# Patient Record
Sex: FEMALE | Race: WHITE | Employment: FULL TIME | URBAN - METROPOLITAN AREA
[De-identification: names, ages, dates, MRNs, and addresses within clinical notes are randomized per-mention and may not be internally consistent; named-entity substitution may affect disease eponyms.]

---

## 2018-10-31 ENCOUNTER — VIRTUAL VISIT (OUTPATIENT)
Dept: FAMILY MEDICINE | Facility: OTHER | Age: 48
End: 2018-10-31

## 2018-11-01 ENCOUNTER — OFFICE VISIT - HEALTHEAST (OUTPATIENT)
Dept: FAMILY MEDICINE | Facility: CLINIC | Age: 48
End: 2018-11-01

## 2018-11-01 ENCOUNTER — COMMUNICATION - HEALTHEAST (OUTPATIENT)
Dept: TELEHEALTH | Facility: CLINIC | Age: 48
End: 2018-11-01

## 2018-11-01 DIAGNOSIS — R07.0 THROAT PAIN: ICD-10-CM

## 2018-11-01 LAB — DEPRECATED S PYO AG THROAT QL EIA: NORMAL

## 2018-11-01 NOTE — PROGRESS NOTES
"Date:   Clinician: Vida Alberts  Clinician NPI: [id number]  Patient: Brianne Reddy  Patient : 1970  Patient Address: [facility] [address], [place] [address]  Patient Phone: [phone]  Visit Protocol: URI  Patient Summary:  Brianne is a 48 year old ( : 1970 ) female who initiated a Visit for cold, sinus infection, or influenza. When asked the question \"Please sign me up to receive news, health information and promotions from [employer].\", Brianne responded \"No\".    Brianne states her symptoms started gradually 3-6 days ago.   Her symptoms consist of enlarged lymph nodes, a sore throat, a cough, chills, malaise, and myalgia. Brianne also feels feverish but was unable to measure her temperature.   Symptom details     Cough: Brianne coughs a few times an hour and her cough is not more bothersome at night. Phlegm does not come into her throat when she coughs.     Sore throat: Brianne reports having severe throat pain (7-9 on a 10 point pain scale), has exudate on her tonsils, and can swallow liquids. The lymph nodes in her neck are enlarged. A rash has not appeared on the skin since the sore throat started.      Brianne denies having headache, ear pain, dyspnea, facial pain or pressure, wheezing, teeth pain, rhinitis, and nasal congestion. She also denies double sickening (worsening symptoms after initial improvement), taking antibiotic medication for the symptoms, and having recent facial or sinus surgery in the past 60 days.   Within the past week, Brainne has been exposed to someone with strep throat. She has not recently been exposed to someone with influenza. Brianne has not been in close contact with any high risk individuals.   Weight: 185 lbs   Brianne does not smoke or use smokeless tobacco.   She denies pregnancy and denies breastfeeding. She has menstruated in the past month.   MEDICATIONS: No current medications, ALLERGIES: NKDA  Clinician Response:  Dear Brianne,  I am " sorry you are not feeling well. Your health is our priority. Based on the information provided, a throat swab is needed to determine whether or not you have strep throat. Please be seen in a clinic or urgent care in the next 1-2 days for a rapid strep test.  You will not be charged for this Visit. Thank you for trusting us with your care.   Diagnosis: Refer for additional evaluation - strep pharyngitis  Diagnosis ICD: R69  Diagnosis ICD: 462.0

## 2018-11-02 LAB — GROUP A STREP BY PCR: NORMAL

## 2020-02-03 ENCOUNTER — APPOINTMENT (OUTPATIENT)
Dept: MAMMOGRAPHY | Facility: CLINIC | Age: 50
End: 2020-02-03
Payer: COMMERCIAL

## 2020-02-03 ENCOUNTER — OFFICE VISIT (OUTPATIENT)
Dept: OBGYN | Facility: CLINIC | Age: 50
End: 2020-02-03
Payer: COMMERCIAL

## 2020-02-03 VITALS
DIASTOLIC BLOOD PRESSURE: 62 MMHG | SYSTOLIC BLOOD PRESSURE: 112 MMHG | WEIGHT: 207.5 LBS | HEIGHT: 65 IN | BODY MASS INDEX: 34.57 KG/M2

## 2020-02-03 DIAGNOSIS — Z12.4 SCREENING FOR CERVICAL CANCER: ICD-10-CM

## 2020-02-03 DIAGNOSIS — Z80.3 FAMILY HISTORY OF MALIGNANT NEOPLASM OF BREAST: ICD-10-CM

## 2020-02-03 DIAGNOSIS — N93.9 ABNORMAL UTERINE BLEEDING (AUB): ICD-10-CM

## 2020-02-03 DIAGNOSIS — Z12.31 ENCOUNTER FOR SCREENING MAMMOGRAM FOR BREAST CANCER: ICD-10-CM

## 2020-02-03 DIAGNOSIS — Z01.419 ENCOUNTER FOR GYNECOLOGICAL EXAMINATION WITHOUT ABNORMAL FINDING: Primary | ICD-10-CM

## 2020-02-03 PROCEDURE — 99203 OFFICE O/P NEW LOW 30 MIN: CPT | Mod: 25 | Performed by: OBSTETRICS & GYNECOLOGY

## 2020-02-03 PROCEDURE — 99386 PREV VISIT NEW AGE 40-64: CPT | Performed by: OBSTETRICS & GYNECOLOGY

## 2020-02-03 ASSESSMENT — MIFFLIN-ST. JEOR: SCORE: 1577.09

## 2020-02-03 NOTE — PROGRESS NOTES
SUBJECTIVE:                                                   Brianne Reddy is a 49 year old  female who presents to clinic today for preventive health exam.   Periods are irregular in the last 2 years, occurring every 2-3 months, lasting up to 2 weeks. No intermenstrual bleeding, spotting, or discharge. No dyspareunia.  no hx STIs.    Tried nuvaring previously to try to regulate cycles and experienced very heavy bleeding with her cycles.     Denies any hirsutism. Does have hx regular periods.     Current contraception: vasectomy    ROS:  Const: mild weight gain,  no fever, chills  Breast: no nipple discharge, lumps, skin changes  Endo: no heat or cold intolerance, fatigue    No results found for: PAP     Family history of breast CA: Yes (Please explain): mother, diagnosed in her 40s. Mother had negative BRCA testing.  Family history of uterine/ovarian CA: Yes (Please explain): mother and maternal grandmother  Family history of colon CA: No    There is no problem list on file for this patient.    Past Surgical History:   Procedure Laterality Date     GYN SURGERY      c-sections x2      Social History     Tobacco Use     Smoking status: Former Smoker     Smokeless tobacco: Never Used   Substance Use Topics     Alcohol use: Yes      Problem (# of Occurrences) Relation (Name,Age of Onset)    Breast Cancer (1) Mother    Cancer (1) Mother: ovarian or uterine, patient unsure    Ovarian Cancer (1) Maternal Grandmother    Prostate Cancer (1) Maternal Grandfather            No current outpatient medications on file prior to visit.  No current facility-administered medications on file prior to visit.     No Known Allergies    Problem list and histories reviewed and adjusted as indicated.     OBJECTIVE:   There were no vitals taken for this visit.    Gen: Healthy appearing female, no acute distress, comfortable  HENT: No scleral injection or icterus, neck supple  CV: Regular rate, well perfused  Resp: normal work of  breathing, no cough  Breast Exam: No visible masses or suspicious skin changes.  No discrete or dominant masses to palpation.  No axillary lymphadenopathy.  GI: Abdomen soft, non-tender. No masses, organomegaly  Skin: No suspicious lesions or rashes  Psychiatric: mentation appears normal and affect bright    : External genitalia normal well-estrogenized, healthy tissue.  No obvious excoriations, lesions, or rashes. Bartholins, urethra, skeins normal.  Normal pink vaginal mucosa.  SSE: Normal cervix, copious blood in the vault  Bimanual: deferred    ASSESSMENT/PLAN:                                                    Brianne Reddy is a 49 year old female  with satisfactory annual exam and initial evaluation of AUB. We discussed the potential etiologies of AUB including hormonal changes (PCOS/oligoovulation, perimenopause), structural abnormalities (polyps, fibroids), endometrial hyperplasia or carcinoma, adenomyosis. Patient counselled on evaluation and potential options for treatment including hormonal management, progestin IUD, surgical intervention. Plan the following work up:    PLAN:  1. Screening for cervical cancer  - will collect pap smear at follow up visit due to copious vaginal bleeding today    2. Encounter for screening mammogram for breast cancer  - MA Diagnostic Digital Bilateral; Future    3. Encounter for gynecological examination without abnormal finding  - Lipid panel reflex to direct LDL Fasting; Future. F/u with PCP if abnormal.  - **Glucose FUTURE anytime; Future    4. Abnormal uterine bleeding (AUB)  - TSH with free T4 reflex; Future  - Prolactin; Future  - US Pelvic Complete with Transvaginal; Future     4. Family history breast cancer  - mother negative for BRCA, grandmother not tested.   - offered consultation with genetic counseling for risk evaluation given strong family history of breast and ovarian cancer. She declines at this time.     Plan endometrial biopsy at follow up  visit, review results at that time.     PE:  Reviewed health maintenance including diet, regular exercise   and periodic exams.    Return to clinic in 2-4 weeks for further evaluation of AUB.    COUNSELING:   Reviewed preventive health counseling, as reflected in patient instructions   reports that she has quit smoking. She has never used smokeless tobacco.        Niki Power MD   Obstetrics and Gynecology

## 2020-02-03 NOTE — NURSING NOTE
"Chief Complaint   Patient presents with     Physical     due for pap, NP.      Orders     for mammogram       Initial /62 (BP Location: Right arm, Patient Position: Chair, Cuff Size: Adult Regular)   Ht 1.651 m (5' 5\")   Wt 94.1 kg (207 lb 8 oz)   LMP 2020 (Approximate)   BMI 34.53 kg/m   Estimated body mass index is 34.53 kg/m  as calculated from the following:    Height as of this encounter: 1.651 m (5' 5\").    Weight as of this encounter: 94.1 kg (207 lb 8 oz).  BP completed using cuff size: regular    Questioned patient about current smoking habits.  Pt. quit smoking some time ago.          The following HM Due: mammogram  pap smear      Billy Stephens CMA                "

## 2020-02-03 NOTE — PATIENT INSTRUCTIONS
Get pelvic US for evaluation of abnormal bleeding    Follow up with me for pap smear and endometrial biopsy following ultrasound    Go to lab for bleeding and routine health labs sometime prior to your visit. You will need to be fasting for 8 hours ahead of time.         Preventive Health Recommendations  Female Ages 40-64  Yearly exam:    See your health care provider every year in order to    Review health changes.      Discuss preventive care.       Review your medicines if you your doctor has prescribed any.    Pap Smears: You should have a Pap test every 3 years or have a Pap test with HPV screening every 5 years.    You do not need a Pap test if your uterus was removed (hysterectomy) and you have not had cancer.   Breast Cancer Screening: You should begin mammography for breast cancer screening by age 40 or 50 depending on your personal risks and your doctors recommendation. Screening should occur every year or every other year based on your discussion with your doctor.  Colorectal Cancer Screening: Starting at age 50 you need to undergo colonoscopy (every 5-10 years) or other colon cancer screening.    Health Maintenance:  - Your cholesterol should be checked every 5 years, more often if you have increased risk factors such as diabetes, high blood pressure, tobacco use, obesity, or a strong family history of cardiovascular disease before age 50 in men and 60 in women  - If you are at risk for diabetes due to being overweight or obese, having a strong family history, or having a history of gestational diabetes you should have a diabetes test (fasting glucose or Hemoglobin A1c level) every 3 years.  Shots: Get a flu shot each year. Get a tetanus shot every 10 years.    Nutrition:      Eat at least 5 servings of fruits and vegetables each day.    Eat whole-grain bread, whole-wheat pasta, faro, quinoa, barley and brown rice instead of white grains and rice.    Consume 1000mg of Calcium and 1000u of Vitamin D  daily. If these are not in your regular diet you should take a supplement.    To calculate the calcium in your food add a zero to the percent found on the packaging (ex: 30% = 300mg of calcium per serving)    Good sources of Calcium include:    Low-fat dairy products (200 to 300 milligrams per serving)      Dark green leafy vegetables     Canned salmon or sardines with bones     Soy products, such as tofu     Calcium-fortified cereals and orange juice    Osteopenia is low bone mass, your risk increases once you reach menopause and your calcium needs then increase to 1,200mg daily    The [place] [employer] recommends that total calcium intake, from supplements and diet combined, should be no more than 2,000 milligrams daily for people older than 50.    Lifestyle    Get in at least 150 minutes of physical activity a week (30 minutes a day, 5 days of the week), of which about half should be vigorous exercise (jogging, swimming, lifting weights).  This will help you control your weight and prevent disease. You must increase the intensity and duration of exercise in order to lose weight, if that is your goal. To keep your bones strong and prevent fractures, plan at least 90 min/week of high impact exercise.    High-impact exercise includes workouts like:  Brisk walking.  Climbing stairs.  Dancing.  Hiking.  Jogging.  Jumping rope.  Step aerobics.  Light weight lifting routines.  Renato Chi and yoga.  Tennis or other racquet sports.      Limit alcohol to one drink per day.    No smoking.      Wear sunscreen to prevent skin cancer.    See your dentist every six months for an exam and cleaning        Menopause and Perimenopause    Hot flashes, night sweats, mood changes, sleep disturbances, changes in the menstrual periods, decreased interest in sex, vaginal dryness or painful sex, and changes in the skin and hair are all common symptoms of perimenopause (the period of time, lasting several years, leading up to menopause).  Menopause is defined as 12 months without a menstrual period.     It might be helpful to make a list of your symptoms, and to rank them in order of how much they bother you, or which ones you would fix first if you could. This can help us decide what treatment options might be best for you. Treatment can include one or more of the following: lifestyle changes like diet and exercise, supplements, prescription medications for hormones, and other prescriptions that are nonhormonal. We will discuss these in more detail after your test results (if you are having lab work done) are available, when you come back. Some tests that might be done for women in perimenopause include blood work for hormone levels and to check the thyroid or ultrasound or biopsy of the lining of the uterus (if you are having abnormal bleeding).

## 2020-05-01 ENCOUNTER — ANCILLARY PROCEDURE (OUTPATIENT)
Dept: ULTRASOUND IMAGING | Facility: CLINIC | Age: 50
End: 2020-05-01
Attending: OBSTETRICS & GYNECOLOGY
Payer: COMMERCIAL

## 2020-05-01 DIAGNOSIS — N93.9 ABNORMAL UTERINE BLEEDING (AUB): ICD-10-CM

## 2020-05-01 PROCEDURE — 76830 TRANSVAGINAL US NON-OB: CPT | Performed by: OBSTETRICS & GYNECOLOGY

## 2020-05-01 PROCEDURE — 76856 US EXAM PELVIC COMPLETE: CPT | Performed by: OBSTETRICS & GYNECOLOGY

## 2020-05-02 NOTE — RESULT ENCOUNTER NOTE
Please contact pt and rev U/S results done for AUB showing a fibroid uterus.  Rec F/U with Dr Power for further Rx    Dwight Lehman M.D.

## 2020-05-06 NOTE — RESULT ENCOUNTER NOTE
Please call patient with fibroid uterus on US, otherwise relatively normal findings. She should follow up with me for a phone visit in the next 2-4w to discuss management.     Niki Power MD

## 2020-06-01 ENCOUNTER — VIRTUAL VISIT (OUTPATIENT)
Dept: OBGYN | Facility: CLINIC | Age: 50
End: 2020-06-01
Payer: COMMERCIAL

## 2020-06-01 DIAGNOSIS — Z12.31 ENCOUNTER FOR SCREENING MAMMOGRAM FOR BREAST CANCER: ICD-10-CM

## 2020-06-01 DIAGNOSIS — N93.9 ABNORMAL UTERINE BLEEDING (AUB): Primary | ICD-10-CM

## 2020-06-01 PROCEDURE — 99213 OFFICE O/P EST LOW 20 MIN: CPT | Mod: GT | Performed by: OBSTETRICS & GYNECOLOGY

## 2020-06-01 RX ORDER — LEVONORGESTREL / ETHINYL ESTRADIOL AND ETHINYL ESTRADIOL 150-30(84)
1 KIT ORAL DAILY
Qty: 90 TABLET | Refills: 3 | Status: SHIPPED | OUTPATIENT
Start: 2020-06-01

## 2020-06-01 NOTE — PATIENT INSTRUCTIONS
"You can have fewer periods every year by taking your birth control pills continuously. After taking your pills regularly for 3 weeks, just skip the placebo pills (they will be a different color in your pack) and start the next pack. You can continue doing this as many packs in a row as you would like, though you may develop spotting or breakthrough bleeding. Most women prefer to take a 5 day pill break every 3 packs or 3 months to have a withdrawal bleed and \"reset\" the lining of the uterus.       Schedule a video visit in 2 months to follow up on combined oral contraceptives start and menopause symptoms.         Understanding Menopause  Menopause marks the point where you ve gone 12 months in a row without a period. The average age for this is around 51, but it can happen at younger or older ages. During the months or years before menopause, your body goes through many changes. It may be helpful to understand these changes and what you can do about the symptoms that result.     Use a portable fan to help stay cool.    Symptoms  Perimenopause is sometimes called the menopause transition. It happens in the months or years before menopause. It may begin when you reach your mid-40s. During this time, your estrogen levels go up and down and then decrease. As a result, you may notice some of the following symptoms:    Menstrual periods that come more or less often than usual    Menstrual periods that are lighter or heavier than normal    Increased premenstrual syndrome (PMS) symptoms    Hot flashes    Night sweats    Mood swings    Vaginal dryness with possible painful sexual activity    Difficulty going to sleep or staying asleep    Decreased sexual drive and function    Urinating frequently  It is important to remember that you could become pregnant until 12 months have passed since your last menstrual period. Ask your healthcare provider about birth control choices.   Controlling symptoms  Your healthcare provider may " suggest pills or an intrauterine device (IUD) that contain the hormone progesterone. This can make your periods more regular and prevent excess bleeding. If you have symptoms due to lower estrogen levels, your healthcare provider may suggest pills that contain estrogen and/or progesterone. This is called hormone therapy (HT).  There are also other prescription medicines that help control some of the bothersome symptoms, like hot flashes, mood swings, and vaginal dryness.  Other ways for you to deal with symptoms are listed below.    Hot flashes. Wear layers that you can remove. Try all-cotton clothing, sheets, and blankets. Keep a glass of cold water by your bed.    Pain during sex. You can buy a silicone or oil-based lubricant or vaginal moisturizer in the drugstore that may help. You may also benefit from an estrogen cream for your vagina which is a prescription.    Mood swings. Talking to friends who are going through the same changes can sometimes help.          Hormone Changes During Menopause  Menopause is not a sudden change. During the months or years before menopause (perimenopause), your ovaries begin to run out of eggs. Your body makes less estrogen and progesterone. This may bring on symptoms such as hot flashes. You ve reached menopause when you have not had a period for 1 year. From that point on, you are in postmenopause.  Perimenopause  In the years leading up to menopause, your ovaries make less estrogen. You release fewer eggs and your periods become less regular.  Symptoms you may have:    Heavier or lighter periods    Longer or shorter time between periods    Hot flashes    Mood swings    Night sweats    Insomnia    Vaginal dryness    Urinary changes including incontinence and frequency  Postmenopause  After menopause, you make very little estrogen. As a result, the uterine lining does not thicken and your periods have ended.  Symptoms you may have:    No periods    Vaginal dryness    Hot  flashes    Mood swings    Night sweats    Insomnia  Surgical menopause  Menopause can occur after a surgical removal of the uterus (hysterectomy) if the ovaries are also removed. Estrogen and progesterone levels decrease quickly. This may cause sudden and severe symptoms.           Perimenopause  Menopause is when you stop having periods for good. For many women, this happens around age 50. The time of change before this is called perimenopause. It may start in your late 30s or 40s. It can last for months or years. During this time, your body makes lower levels of female hormones. This causes certain changes in your body. You may already have begun to feel the effects of these changes. By taking steps to relieve symptoms, you can still feel good.    The menstrual cycle  Hormones are chemicals that have specific jobs in the body. A menstrual cycle is caused by changes in the levels of 2 female hormones. These hormones are estrogen and progesterone. They are made by the ovaries. In a normal cycle, estrogen creates a lining in the uterus to allow for pregnancy. The ovary then releases an egg. This is called ovulation. Progesterone levels start to go up. If the egg is not fertilized, progesterone levels go down. This causes the lining in the uterus to be shed. This is the bleeding that is your period.  Changes in hormones  As a woman ages, her ovaries begin to make hormones less regularly. In some months, there may not be ovulation. Without ovulation, progesterone isn't secreted so a normal period doesn't occur. The menstrual cycle will be harder to predict. Over time, the ovaries stop working. This can cause symptoms. Some women who have had their uterus taken out (hysterectomy) but still have ovaries may still have symptoms. When estrogen levels reach their lowest point, periods will stop completely. This is menopause.  Symptoms of perimenopause  The change in hormones can cause physical symptoms. It can also cause  emotional symptoms. These may include:    Periods that come more or less often    Periods that are lighter or heavier than you re used to    Hot flashes, night sweats, or trouble sleeping    Vaginal dryness, which may make sex painful    Mood swings or fatigue    Palpitations    Sleep disturbances    Urinary symptoms including frequency and incontinence.  Medications that may help  Some medications can help ease the effects of perimenopause. These include:    Low-dose birth control pills. These often contain both estrogen and progesterone. They can help regulate your periods.    Hormone therapy (HT). This replaces some of the hormones your body has stopped making.    Antidepressants. These help balance brain chemicals that may decrease during this time. Signs of depression can include often feeling sad or hopeless. If you feel this way, be sure to talk to your health care provider.    Gabapentin. This is a medication also used to treat seizures. This controls hot flashes and night sweats in some women.    Clonidine. This medication may control hot flashes and night sweats.

## 2020-06-01 NOTE — PROGRESS NOTES
"Brianne Reddy is a 49 year old female who is being evaluated via a billable video visit.      The patient has been notified of following:     \"This video visit will be conducted via a call between you and your physician/provider. We have found that certain health care needs can be provided without the need for an in-person physical exam.  This service lets us provide the care you need with a video conversation.  If a prescription is necessary we can send it directly to your pharmacy.  If lab work is needed we can place an order for that and you can then stop by our lab to have the test done at a later time.    Video visits are billed at different rates depending on your insurance coverage.  Please reach out to your insurance provider with any questions.    If during the course of the call the physician/provider feels a video visit is not appropriate, you will not be charged for this service.\"    Patient has given verbal consent for Video visit? Yes    How would you like to obtain your AVS? Mail a copy    Patient would like the video invitation sent by: Text to cell phone: [phone]    Will anyone else be joining your video visit? No    Irena Beatty CMA      Video-Visit Details    Type of service:  Video Visit    Video Start Time: 1:25 PM  Video End Time: 1:40 PM    Originating Location (pt. Location): Home    Distant Location (provider location):  [facility]     Platform used for Video Visit: [employer]     CC: TUCKER    S: Brianne Reddy is a 49 year old female  f/u for AUB. No menses since . It was quite heavy at that time. She is frustrated with not knowing when to expect her next menses and with how heavy her bleeding was last time. Used nuvaring in the past, resulted in heavy bleeding. Used oral contraceptives when she was younger without difficulty.     ROS:  Const: no weight changes, fever, chills  Heme: no history blood clots or easy bruising/bleeding  Neuro: no Hx " migraine, no aura    O:  Gen: sitting in car in no acute distress, comfortable  HEENT: NC/AT  Pulm: no increased work of breathing, no cough  Psych: mood stable, appropriate affect  Neuro: A+Ox3     A/P: Brianne Reddy is a 49 year old female  who is seen virtually in a video visit for f/u AUB. She has not yet obtained labs previously ordered, but plans to this week. Reviewed US results which are essentially non-contributory to her AUB. Suspect perimenopause given vasomotor symptoms. As her biggest complaint is irregular timing, she would like to pursue continuous use oral contraceptives as an initial step to managing AUB. Anticipate this will also improve her vasomotor symptoms.     1. Encounter for screening mammogram for breast cancer  - MA Diagnostic Digital Bilateral; Future    2. Abnormal uterine bleeding (AUB)  - proceed with previously ordered labs including prolactin, TSH, Hgb, lipids, and glucose.  - levonorgest-eth estrad 91-Day (SEASONIQUE) 0.15-0.03 &0.01 MG tablet; Take 1 tablet by mouth daily  Dispense: 90 tablet; Refill: 3     F/u 2 months for evaluation of new med start.       Niki Power MD

## 2021-01-03 ENCOUNTER — HEALTH MAINTENANCE LETTER (OUTPATIENT)
Age: 51
End: 2021-01-03

## 2021-01-18 ENCOUNTER — AMBULATORY - HEALTHEAST (OUTPATIENT)
Dept: NURSING | Facility: CLINIC | Age: 51
End: 2021-01-18

## 2021-02-09 ENCOUNTER — AMBULATORY - HEALTHEAST (OUTPATIENT)
Dept: NURSING | Facility: CLINIC | Age: 51
End: 2021-02-09

## 2021-03-07 ENCOUNTER — HEALTH MAINTENANCE LETTER (OUTPATIENT)
Age: 51
End: 2021-03-07

## 2021-06-02 VITALS — WEIGHT: 194.3 LBS

## 2021-06-21 NOTE — PROGRESS NOTES
Subjective:      Patient ID: Brianne Reddy is a 48 y.o. female.    Chief Complaint:    Sore Throat    This is a new problem. Episode onset: 2 days. The problem has been gradually worsening. Neither side of throat is experiencing more pain than the other. Maximum temperature: subjective. The fever has been present for 1 to 2 days. The pain is at a severity of 8/10. Associated symptoms include coughing, a hoarse voice and swollen glands. Pertinent negatives include no abdominal pain, congestion, diarrhea, ear pain, shortness of breath or vomiting. 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 She has tried NSAIDs (losenges, dayquil, nyquil) for the symptoms. The treatment provided mild relief.   Cough   This is a new problem. Episode onset: 2 days. The problem occurs daily. The problem has been unchanged. Associated symptoms include chills, coughing and swollen glands. Pertinent negatives include no abdominal pain, chest pain, congestion, nausea or vomiting.          Social History   Substance Use Topics     Smoking status: Never Smoker     Smokeless tobacco: Never Used     Alcohol use None       Review of Systems   Constitutional: Positive for chills.   HENT: Positive for hoarse voice. Negative for congestion and ear pain.    Respiratory: Positive for cough. Negative for shortness of breath.    Cardiovascular: Negative for chest pain.   Gastrointestinal: Negative for abdominal pain, diarrhea, nausea and vomiting.       Objective:     /78 (Patient Site: Right Arm, Patient Position: Sitting, Cuff Size: Adult Regular)  Pulse 88  Temp 97.9  F (36.6  C) (Oral)   Resp 16  Wt 194 lb 4.8 oz (88.1 kg)  LMP 10/13/2018  SpO2 98%    Physical Exam   Constitutional: She appears well-developed and well-nourished. No distress.   HENT:   Head: Normocephalic and atraumatic.   Right Ear: Tympanic membrane, external ear and ear canal normal.   Left Ear: Tympanic membrane, external ear and ear canal normal.   Mouth/Throat:  Uvula is midline. Posterior oropharyngeal erythema present. No oropharyngeal exudate, posterior oropharyngeal edema or tonsillar abscesses.   Eyes: Conjunctivae are normal.   Neck: Normal range of motion. Neck supple.   Cardiovascular: Normal rate, regular rhythm and normal heart sounds.  Exam reveals no gallop and no friction rub.    No murmur heard.  Pulmonary/Chest: Effort normal and breath sounds normal. No respiratory distress. She has no wheezes. She has no rales.   Lymphadenopathy:     She has no cervical adenopathy.   Skin: She is not diaphoretic.   Psychiatric: She has a normal mood and affect. Her behavior is normal. Judgment and thought content normal.   Nursing note and vitals reviewed.    Labs:  Recent Results (from the past 24 hour(s))   Rapid Strep A Screen-Throat   Result Value Ref Range    Rapid Strep A Antigen No Group A Strep detected, presumptive negative No Group A Strep detected, presumptive negative     Clinical Decision Making:  RST neg today, confirmatory strep testing pending. Suspect viral etiology. Patient has no PMHx of lung disease or risk factors for influenza. Patient is currently afebrile without antipyretics. PE is relatively benign. No signs of peritonsillar abscess. Sxs not present long enough to test for mono although this remains on DDx.     Assessment:     Procedures    1. Throat pain  Rapid Strep A Screen-Throat    Group A Strep, RNA Direct Detection, Throat         Patient Instructions   1) Increase fluids and rest  2) Continue taking Tylenol/Ibuprofen for fever/pain relief as needed.  3) Salt water gargles and lozenges can be helpful for throat relief  4) You will only be notified of the confirmatory strep results if they are positive.   5) Follow up if you don't have any improvement in an additional 3 days. Follow up sooner if new or worsening symptoms.

## 2021-10-10 ENCOUNTER — HEALTH MAINTENANCE LETTER (OUTPATIENT)
Age: 51
End: 2021-10-10

## 2022-03-26 ENCOUNTER — HEALTH MAINTENANCE LETTER (OUTPATIENT)
Age: 52
End: 2022-03-26

## 2022-09-18 ENCOUNTER — HEALTH MAINTENANCE LETTER (OUTPATIENT)
Age: 52
End: 2022-09-18

## 2023-05-07 ENCOUNTER — HEALTH MAINTENANCE LETTER (OUTPATIENT)
Age: 53
End: 2023-05-07